# Patient Record
Sex: MALE | Race: WHITE | NOT HISPANIC OR LATINO | Employment: FULL TIME | URBAN - METROPOLITAN AREA
[De-identification: names, ages, dates, MRNs, and addresses within clinical notes are randomized per-mention and may not be internally consistent; named-entity substitution may affect disease eponyms.]

---

## 2017-03-31 ENCOUNTER — TELEPHONE (OUTPATIENT)
Dept: RHEUMATOLOGY | Facility: CLINIC | Age: 57
End: 2017-03-31

## 2017-03-31 NOTE — TELEPHONE ENCOUNTER
----- Message from Heath Carmona sent at 3/31/2017 11:56 AM CDT -----  Contact: Dr Boyer's Office - [place]  [phone]  States that the patient has an appointment for June and would like to know if the patient can be seen sooner.  Tests were already ran, Labs also.  Patient's dominant right hand is very swollen.  Can you please call Marnie or the patient back for an earlier appointment.  Patient's number is [phone].  Thank you     3-31-17 Left message for Marnie that the doctor  is currently reviewing his records to determine if he needs to be seen sooner than June. Patient will be notified after the doctor decides. CG

## 2017-04-06 ENCOUNTER — TELEPHONE (OUTPATIENT)
Dept: RHEUMATOLOGY | Facility: CLINIC | Age: 57
End: 2017-04-06

## 2017-04-06 NOTE — TELEPHONE ENCOUNTER
Left message that Dr. Sheehan has reviewed his records and that she does want us to see him sooner than June. New appointment will be  Mailed. Call back number is provided. SEEMA

## 2017-05-04 ENCOUNTER — INITIAL CONSULT (OUTPATIENT)
Dept: RHEUMATOLOGY | Facility: CLINIC | Age: 57
End: 2017-05-04
Payer: COMMERCIAL

## 2017-05-04 VITALS — HEART RATE: 82 BPM | SYSTOLIC BLOOD PRESSURE: 112 MMHG | WEIGHT: 185.44 LBS | DIASTOLIC BLOOD PRESSURE: 71 MMHG

## 2017-05-04 DIAGNOSIS — M13.0 POLYARTHRITIS: Primary | ICD-10-CM

## 2017-05-04 PROCEDURE — 99999 PR PBB SHADOW E&M-EST. PATIENT-LVL III: CPT | Mod: PBBFAC,,, | Performed by: INTERNAL MEDICINE

## 2017-05-04 PROCEDURE — 1160F RVW MEDS BY RX/DR IN RCRD: CPT | Mod: S$GLB,,, | Performed by: INTERNAL MEDICINE

## 2017-05-04 PROCEDURE — 99205 OFFICE O/P NEW HI 60 MIN: CPT | Mod: S$GLB,,, | Performed by: INTERNAL MEDICINE

## 2017-05-04 RX ORDER — NAPROXEN SODIUM 220 MG
220 TABLET ORAL DAILY
COMMUNITY

## 2017-05-04 RX ORDER — IBUPROFEN 100 MG/5ML
1000 SUSPENSION, ORAL (FINAL DOSE FORM) ORAL DAILY
COMMUNITY

## 2017-05-04 RX ORDER — GUAIFENESIN AND PHENYLEPHRINE HCL 400; 10 MG/1; MG/1
1 TABLET ORAL DAILY
COMMUNITY

## 2017-05-04 RX ORDER — BETAMETHASONE DIPROPIONATE 0.5 MG/G
OINTMENT TOPICAL
Refills: 0 | COMMUNITY
Start: 2017-03-06

## 2017-05-04 RX ORDER — CETIRIZINE HYDROCHLORIDE 10 MG/1
10 TABLET ORAL DAILY
COMMUNITY

## 2017-05-04 ASSESSMENT — ROUTINE ASSESSMENT OF PATIENT INDEX DATA (RAPID3)
WHEN YOU AWAKENED IN THE MORNING OVER THE LAST WEEK, PLEASE INDICATE THE AMOUNT OF TIME IT TAKES UNTIL YOU ARE AS LIMBER AS YOU WILL BE FOR THE DAY: 30 MINUTES AFTER WAKING
PAIN SCORE: 5
PSYCHOLOGICAL DISTRESS SCORE: 1.1
MDHAQ FUNCTION SCORE: 1
PATIENT GLOBAL ASSESSMENT SCORE: 1
FATIGUE SCORE: 0
TOTAL RAPID3 SCORE: 3.11
AM STIFFNESS SCORE: 1, YES

## 2017-05-04 NOTE — LETTER
May 12, 2017      Duane Boyer MD  [address] [facility]  [facility] [address]           [facility]  [address] [facility] [address]  Phone: [phone]  Fax: [phone]          Patient: Kris Garsia   MR Number: [id number]   YOB: 1960   Date of Visit: 5/4/2017       Dear Dr. Duane Boyer:    Thank you for referring Kris Garsia to me for evaluation. Attached you will find relevant portions of my assessment and plan of care.    If you have questions, please do not hesitate to call me. I look forward to following Kris Garsia along with you.    Sincerely,    Olivia Franklin  CC:  No Recipients    If you would like to receive this communication electronically, please contact [email or web address] or [phone] to request more information on [employer] access.    For providers and/or their staff who would like to refer a patient to [facility], please contact us through our one-stop-shop provider referral line, [facility], at [phone].    If you feel you have received this communication in error or would no longer like to receive these types of communications, please e-mail [email or web address]

## 2017-05-04 NOTE — PROGRESS NOTES
Subjective:          Chief Complaint: Kris Mccormick is a 57 y.o. male who had concerns including Consult.    HPI:    Patient referred for persistent swelling the right hand began suddenly after workin in attic. She was seen with Ms Lawton PCP for acute loss of mobility, swelling, and warmth. No now puncture, bite or injury. Did have imaging no fracture. He did have MRI with tenosynovitis. Treated with ABX no change, treated with Medrol some help. No change with Colchicine. No improvement with Duexis. Another longer prednisone with 40mg 1st week, 20mg 2nd week and 10mg x 1 week no real change.   Began OT with some improvement. [place] no real help. Dr. Alejandro attempted drainage no fluid. Following with [facility] with OT.   Seen with Derm for paronychia. No personal hx of psoriasis, no hx of kidney stone. No other joint swelling in past. ETOH- 6-8 beers weekly and none since swelling started.   Some fevers. No other joints. Trying cherry juice and apple cider vinegar with some improvement. OT has been most effective     Labs:   RF negative, RAMILA negative, CRP slightly elevated, ESR normal     REVIEW OF SYSTEMS:    Review of Systems   Constitutional: Negative for fever, malaise/fatigue and weight loss.   HENT: Negative for sore throat.    Eyes: Negative for double vision, photophobia and redness.   Respiratory: Negative for cough, shortness of breath and wheezing.    Cardiovascular: Negative for chest pain, palpitations and orthopnea.   Gastrointestinal: Negative for abdominal pain, constipation and diarrhea.   Genitourinary: Negative for dysuria, hematuria and urgency.   Musculoskeletal: Positive for joint pain. Negative for back pain and myalgias.   Skin: Negative for rash.   Neurological: Negative for dizziness, tingling, focal weakness and headaches.   Endo/Heme/Allergies: Does not bruise/bleed easily.   Psychiatric/Behavioral: Negative for depression, hallucinations and suicidal ideas.               Objective:             Past Medical History:   Diagnosis Date    Angioedema     Bursitis     Cellulitis     Gout     PSA (psoriatic arthritis)     Tenosynovitis      Family History   Problem Relation Age of Onset    Hyperlipidemia Mother     Kidney disease Father     Heart disease Father     Hyperlipidemia Father     No Known Problems Brother     No Known Problems Daughter     ALS Brother     No Known Problems Daughter      Social History   Substance Use Topics    Smoking status: Never Smoker    Smokeless tobacco: Never Used    Alcohol use 3.6 oz/week     6 Cans of beer per week         No current outpatient prescriptions on file prior to visit.     No current facility-administered medications on file prior to visit.        Vitals:    05/04/17 1523   BP: 112/71   Pulse: 82       Physical Exam:    Physical Exam   Constitutional: He appears well-developed and well-nourished.   HENT:   Head: Atraumatic.   Nose: No nasal deformity.   Mouth/Throat: No oral lesions. No dental caries.   Eyes: Pupils are equal, round, and reactive to light. Right conjunctiva is not injected. Left conjunctiva is not injected.   Neck: No JVD present.   Cardiovascular: Normal rate and regular rhythm.    Pulmonary/Chest: Effort normal and breath sounds normal.   Abdominal: Soft. Bowel sounds are normal. There is no hepatosplenomegaly.   Musculoskeletal:        Right shoulder: He exhibits normal range of motion, no tenderness, no effusion and no crepitus.        Left shoulder: He exhibits normal range of motion, no tenderness, no effusion and no crepitus.        Right elbow: He exhibits normal range of motion and no effusion. No tenderness found.        Left elbow: He exhibits normal range of motion and no effusion. No tenderness found.        Right wrist: He exhibits decreased range of motion and swelling. He exhibits no tenderness.        Left wrist: He exhibits normal range of motion, no tenderness and no swelling.        Right hip: He  exhibits normal range of motion, no tenderness and no swelling.        Left hip: He exhibits normal range of motion, no tenderness and no swelling.        Right knee: He exhibits normal range of motion and no swelling. No tenderness found.        Left knee: He exhibits normal range of motion and no swelling. No tenderness found.        Right ankle: He exhibits normal range of motion and no swelling. No tenderness.        Left ankle: He exhibits normal range of motion and no swelling. No tenderness.        Left hand: He exhibits decreased range of motion, tenderness and swelling.   Lymphadenopathy:     He has no cervical adenopathy.   Neurological: He has normal strength.   Skin: Skin is warm, dry and intact.   Psychiatric: He has a normal mood and affect.             Assessment:       Encounter Diagnosis   Name Primary?    Polyarthritis Yes          Plan:        Polyarthritis  Comments:  Right hand with persistent diffuse synovitis. MRI with tensynovitis of the extensor tendons.   Orders:  -     X-Ray Hand Complete Right; Future; Expected date: 5/4/17  -     X-Ray Wrist Complete Right; Future; Expected date: 5/4/17  -     Cyclic citrul peptide antibody, IgG; Future; Expected date: 5/4/17  -     Rheumatoid factor; Future; Expected date: 5/4/17  -     Hepatitis panel, acute; Future; Expected date: 5/4/17  -     Sedimentation rate, manual; Future; Expected date: 5/4/17  -     C-reactive protein; Future; Expected date: 5/4/17      Very pleasant 57y/o gentleman with acute and persistent swelling in the right wrist/hand. Has seen multiple consultants. No real response with Colchicine. Suspect RA or other inflammatory arthritis.   Return in about 6 weeks (around 6/15/2017).      60min consultation with greater than 50% spent in counseling, chart review and coordination of care. All questions answered.  Thank you for allowing me to participate in the care of this very pleasant patient.

## 2017-05-04 NOTE — MR AVS SNAPSHOT
[facility] Rheumatology  [address] [facility] [address]  [facility] [address]  Phone: [phone]  Fax: [phone]                  Kris Mccormick   2017 3:00 PM   Initial consult    Description:  Male : 1960   Provider:  Marcia Sheehan DO   Department:  [facility] - Rheumatology           Reason for Visit     Consult           Diagnoses this Visit        Comments    Polyarthritis    -  Primary Right hand with persistent diffuse synovitis. MRI with tensynovitis of the extensor tendons.            To Do List           Future Appointments        Provider Department Dept Phone    2017 9:00 AM [facility] XR3 [facility] [phone]    2017 10:30 AM LAB, [place] [facility] [phone]    2017 8:00 AM Marcia Sheehan DO [facility] [phone]      Goals (5 Years of Data)     None      Follow-Up and Disposition     Return in about 6 weeks (around 6/15/2017).      [facility]     [facility] Nurse Care Line -  Assistance  Unless otherwise directed by your provider, please contact [facility], our nurse care line that is available for  assistance.     Registered nurses in the [facility] provide: appointment scheduling, clinical advisement, health education, and other advisory services.  Call: [phone] (toll free)               Medications           Message regarding Medications     Verify the changes and/or additions to your medication regime listed below are the same as discussed with your clinician today.  If any of these changes or additions are incorrect, please notify your healthcare provider.             Verify that the below list of medications is an accurate representation of the medications you are currently taking.  If none reported, the list may be blank. If incorrect, please contact your healthcare provider. Carry this list with you in case of emergency.           Current Medications     ascorbic  acid, vitamin C, (VITAMIN C) 1000 MG tablet Take 1,000 mg by mouth once daily.    betamethasone dipropionate (DIPROLENE) 0.05 % ointment KAVITA TO AFFECTED THUMB BID    cetirizine (ZYRTEC) 10 MG tablet Take 10 mg by mouth once daily.    MULTIVIT-MIN/FA/VIT K/LYCOPENE (ONE-A-DAY MEN'S 50 PLUS ORAL) Take 1 tablet by mouth once daily at 6am.    naproxen sodium (ANAPROX) 220 MG tablet Take 220 mg by mouth once daily at 6am.    ranitidine (ZANTAC) 150 MG capsule Take 150 mg by mouth once daily at 6am.    turmeric root extract 500 mg Cap Take 1 capsule by mouth once daily at 6am.           Clinical Reference Information           Your Vitals Were     BP Pulse Weight             112/71 (BP Location: Left arm, Patient Position: Sitting, BP Method: Automatic) 82 84.1 kg (185 lb 6.5 oz)         Blood Pressure          Most Recent Value    BP  112/71      Allergies as of 5/4/2017     No Known Allergies      Immunizations Administered on Date of Encounter - 5/4/2017     None      Orders Placed During Today's Visit     Future Labs/Procedures Expected by Expires    C-reactive protein  5/4/2017 7/3/2018    Cyclic citrul peptide antibody, IgG  5/4/2017 (Approximate) 5/4/2018    Hepatitis panel, acute  5/4/2017 3/25/2018    Rheumatoid factor  5/4/2017 (Approximate) 5/4/2018    Sedimentation rate, manual  5/4/2017 (Approximate) 5/4/2018    X-Ray Hand Complete Right  5/4/2017 5/4/2018    X-Ray Wrist Complete Right  5/4/2017 5/4/2018      [facility] Sign-Up     Activating your [facility] account is as easy as 1-2-3!     1) Visit [email or web address], select Sign Up Now, enter this activation code and your date of birth, then select Next.  [email or web address]  Expires: 6/18/2017  5:00 PM      2) Create a username and password to use when you visit [facility] in the future and select a security question in case you lose your password and select Next.    3) Enter your e-mail address and click Sign Up!    Additional Information  If you have questions,  please e-mail [email or web address] or call [phone] to talk to our [facility] staff. Remember, [facility] is NOT to be used for urgent needs. For medical emergencies, dial 911.         Language Assistance Services     ATTENTION: Language assistance services are available, free of charge. Please call [phone].      ATENCIÓN: Si habla español, tiene a petersen disposición servicios gratuitos de asistencia lingüística. Llame al [phone].     [facility] Ý: N?u b?n nói Ti?ng Vi?t, có các d?ch v? h? tr? ngôn ng? mi?n phí dành cho b?n. G?i s? [phone].         [facility] complies with applicable Federal civil rights laws and does not discriminate on the basis of race, color, national origin, age, disability, or sex.

## 2017-05-05 ENCOUNTER — HOSPITAL ENCOUNTER (OUTPATIENT)
Dept: RADIOLOGY | Facility: HOSPITAL | Age: 57
Discharge: HOME | End: 2017-05-05
Attending: INTERNAL MEDICINE
Payer: COMMERCIAL

## 2017-05-05 DIAGNOSIS — M13.0 POLYARTHRITIS: ICD-10-CM

## 2017-05-05 PROCEDURE — 73110 X-RAY EXAM OF WRIST: CPT | Mod: 26,RT,, | Performed by: RADIOLOGY

## 2017-05-05 PROCEDURE — 73130 X-RAY EXAM OF HAND: CPT | Mod: 26,RT,, | Performed by: RADIOLOGY

## 2017-05-05 PROCEDURE — 73110 X-RAY EXAM OF WRIST: CPT | Mod: TC,PO,RT

## 2017-05-05 PROCEDURE — 73130 X-RAY EXAM OF HAND: CPT | Mod: TC,PO,RT

## 2017-05-12 ENCOUNTER — TELEPHONE (OUTPATIENT)
Dept: RHEUMATOLOGY | Facility: CLINIC | Age: 57
End: 2017-05-12

## 2017-05-12 NOTE — TELEPHONE ENCOUNTER
Returned patient's call. No answer. lvm on both lines provided to return clinics call regarding referral to [facility] and lab results.

## 2017-08-08 ENCOUNTER — DOCUMENTATION ONLY (OUTPATIENT)
Dept: RHEUMATOLOGY | Facility: CLINIC | Age: 57
End: 2017-08-08

## 2017-08-08 NOTE — PROGRESS NOTES
Received pathology report from Dr. Westfall 7/2017 with non-caseating granulomatous tenosynovitis. Suggestive of infectious vs inflammatory.

## 2021-10-11 ENCOUNTER — OCCUPATIONAL HEALTH (OUTPATIENT)
Dept: URGENT CARE | Facility: CLINIC | Age: 61
End: 2021-10-11

## 2021-10-11 DIAGNOSIS — Z02.1 PRE-EMPLOYMENT EXAMINATION: Primary | ICD-10-CM

## 2021-10-11 PROCEDURE — 99499 PHYSICAL, BASIC COMPLEXITY: ICD-10-PCS | Mod: S$GLB,,, | Performed by: FAMILY MEDICINE

## 2021-10-11 PROCEDURE — 80305 OOH NON-DOT DRUG SCREEN: ICD-10-PCS | Mod: S$GLB,,, | Performed by: FAMILY MEDICINE

## 2021-10-11 PROCEDURE — 80305 DRUG TEST PRSMV DIR OPT OBS: CPT | Mod: S$GLB,,, | Performed by: FAMILY MEDICINE

## 2021-10-11 PROCEDURE — 99499 UNLISTED E&M SERVICE: CPT | Mod: S$GLB,,, | Performed by: FAMILY MEDICINE
